# Patient Record
Sex: FEMALE | Race: WHITE | Employment: UNEMPLOYED | URBAN - METROPOLITAN AREA
[De-identification: names, ages, dates, MRNs, and addresses within clinical notes are randomized per-mention and may not be internally consistent; named-entity substitution may affect disease eponyms.]

---

## 2022-07-27 ENCOUNTER — HOSPITAL ENCOUNTER (OUTPATIENT)
Dept: PHYSICAL THERAPY | Age: 37
Discharge: HOME | End: 2022-07-27
Payer: COMMERCIAL

## 2022-07-27 PROCEDURE — 97110 THERAPEUTIC EXERCISES: CPT

## 2022-07-27 PROCEDURE — 97161 PT EVAL LOW COMPLEX 20 MIN: CPT

## 2022-07-27 PROCEDURE — 97140 MANUAL THERAPY 1/> REGIONS: CPT

## 2022-07-27 NOTE — PROGRESS NOTES
[facility]  [address]  [phone] fax  Plan of Care/ Statement of Necessity for Physical Therapy Services    Patient name: Mark Rogel Start of Care: 2022   Referral source: Kris PittsJenniema : 1985    Medical Diagnosis: Radiculopathy, lumbar region [M54.16]  Payor: Maria M Neal / Plan: Michael Mcfarland RPN / Product Type: Commerical /  Onset Date:2022    Treatment Diagnosis: low back pain   Prior Hospitalization: see medical history Provider#: [id number]   Medications: Verified on Patient summary List    Comorbidities: latex allergy, HTN during pregancy    Prior Level of Function: Functionally independent, lives with family, cares for 1year old son      The Plan of Care and following information is based on the information from the initial evaluation. Assessment/ key information: Patient is a pleasant 30year old female who presents with complaints of low back pain that occasionally radiates into the LEs, Right more than Left, since pregnancy with her son three years ago. Patient reports that pain has been present over these three years, but in the past 1-2 months has become more severe and constant without specific aggravating factors, leading her to seek chiropractic and orthopedic care. Extension-based stretches at home have helped as well, leading to current pain being more in her lower back than LEs. X-rays reveal decreased disc height at L4-5, and Patient is scheduled for an MRI on 22.  At evaluation Patient demonstrates good posture and gait, with the following objective measures:   lumbar AROM: flexion WNL, extension to neutral due to pain/fear of pain, rotation 50% of full bilaterally, side bending 75% of full bilaterally  LE strength: hip flexion 5/5, abduction Right 4/5 with pain and Left 4+/5 with pain, extension 5/5 bilaterally with pain Right, quads Right 4/5 with pain and Left 5/5  Full supine bridge but with pain  Impaired hamstring flexibility bilaterally     Negative for red flags, signs and symptoms consistent with mechanical back pain with possible bulging disc. Overall Patient is a good rehab candidate based on premorbid status and will benefit from skilled physical therapy in order to address the above deficits. Evaluation Complexity History MEDIUM  Complexity : 1-2 comorbidities / personal factors will impact the outcome/ POC ; Examination MEDIUM Complexity : 3 Standardized tests and measures addressing body structure, function, activity limitation and / or participation in recreation  ;Presentation LOW Complexity : Stable, uncomplicated  ;Clinical Decision Making MEDIUM Complexity : FOTO score of 26-74  Overall Complexity Rating: LOW   Problem List: pain affecting function, decrease ROM, decrease strength, edema affecting function, impaired gait/ balance, decrease ADL/ functional abilitiies, decrease activity tolerance, decrease flexibility/ joint mobility, and decrease transfer abilities   Treatment Plan may include any combination of the following: Therapeutic exercise, Therapeutic activities, Neuromuscular re-education, Physical agent/modality, Gait/balance training, Manual therapy, Aquatic therapy, Patient education, Self Care training, Functional mobility training, Home safety training, Stair training, and Other: mechanical traction PRN  Patient / Family readiness to learn indicated by: asking questions, trying to perform skills, and interest  Persons(s) to be included in education: patient (P)  Barriers to Learning/Limitations: None  Patient Goal (s): relief, improve daily life  Patient Self Reported Health Status: good  Rehabilitation Potential: good    Short Term Goals: To be accomplished in 1 weeks:  1. Patient will be independent and compliant with HEP in order to progress toward long term goals. Status at last note/certification: issued and reviewed  Long Term Goals:  To be accomplished in 6 weeks:  Patient will improve FOTO assessment score to 72 pts in order to indicate improved functional abilities. Status at last note/certification: 61 pts  2. Patient will improve lumbar AROM to WNL throughout without increased pain in order to improve ease of ADLs. Status at last note/certification: flexion WNL, extension to neutral due to pain/fear of pain, rotation 50% of full bilaterally, side bending 75% of full bilaterally  3. Patient will report no increase in low back pain with supine bridges or with resisted prone hip extension in order to indicate improved gluteal engagement and reduced spinal strain with mobility. Status at last note/certification: pain with bridging, pain with resisted Right hip extension  4. Patient will report worst low back pain as 4/10 or less in order to improve standing and sitting tolerance. Status at last note/certification: 7/23     Frequency / Duration: Patient to be seen 1 to 2 times per week for 6 weeks. Patient/ Caregiver education and instruction: Diagnosis, prognosis, exercises   [x]  Plan of care has been reviewed with PTA    Vianey Uriarte, PT 7/27/2022 1:56 PM  _____________________________________________________________________  I certify that the above Therapy Services are being furnished while the patient is under my care. I agree with the treatment plan and certify that this therapy is necessary.     Physician's Signature:____________Date:_________TIME:________  ADOLFO Young  ** Signature, Date and Time must be completed for valid certification **    Please sign and return to [facility]  [address] [phone] fax

## 2022-07-27 NOTE — PROGRESS NOTES
PT DAILY TREATMENT NOTE     Patient Name: Familia Amanda  HQSW:[id number]  : 1985  [x]  Patient  Verified  Payor: Meghan Schooling / Plan: Khoa Lagunas RPN / Product Type: Commerical /    In time:200  Out time:243  Total Treatment Time (min): 43  Visit #: 1 of     Medicare/BCBS Only   Total Timed Codes (min):   1:1 Treatment Time:         Treatment Area: Radiculopathy, lumbar region [M54.16]    SUBJECTIVE  Pain Level (0-10 scale): 5/10  Any medication changes, allergies to medications, adverse drug reactions, diagnosis change, or new procedure performed?: [x] No    [] Yes (see summary sheet for update)  Subjective functional status/changes:   [] No changes reported  Patient is a pleasant 30year old female who presents with complaints of low back pain that occasionally radiates into the LEs, Right more than Left, since pregnancy with her son three years ago. Patient reports that pain has been present over these three years, but in the past 1-2 months has become more severe and constant, leading her to seek chiropractic and orthopedic care. Extension-based stretches at home have helped as well, leading to current pain being more in her lower back than LEs.      OBJECTIVE    Modality rationale: decrease pain and increase tissue extensibility to improve the patients ability to reduce soreness after exercises    Min Type Additional Details    [] Estim:  []Unatt       []IFC  []Premod                        []Other:  []w/ice   []w/heat  Position:  Location:    [] Estim: []Att    []TENS instruct  []NMES                    []Other:  []w/US   []w/ice   []w/heat  Position:  Location:    []  Traction: [] Cervical       []Lumbar                       [] Prone          []Supine                       []Intermittent   []Continuous Lbs:  [] before manual  [] after manual    []  Ultrasound: []Continuous   [] Pulsed                           []1MHz   []3MHz W/cm2:  Location:    []  Iontophoresis with dexamethasone Location: [] Take home patch   [] In clinic   x []  Ice     []  heat  []  Ice massage  []  Laser   []  Anodyne Position:  Location:    []  Laser with stim  []  Other:  Position:  Location:    []  Vasopneumatic Device    []  Right     []  Left  Pre-treatment girth:  Post-treatment girth:  Measured at (location):  Pressure:       [] lo [] med [] hi   Temperature: [] lo [] med [] hi   [] Skin assessment post-treatment:  []intact []redness- no adverse reaction    []redness - adverse reaction:     Vasopnuematic compression justification:  Per bilateral girth measures taken and listed above the edema is considered significant and having an impact on the patient's strength and gait/posture       18 min [x]Eval                  []Re-Eval       15 min Therapeutic Exercise:  [] See flow sheet :HEP  Patient education on diagnosis and prognosis with spine model    Rationale: increase ROM and increase strength to improve the patients ability to improve sitting, standing tolerance    x min Therapeutic Activity:  []  See flow sheet :   Rationale: increase ROM, increase strength, improve coordination, and increase proprioception  to improve the patients ability to improve spinal stability with functional tasks       10 min Manual Therapy:  prone STM/DTM to lumbar paraspinals and bilateral gluts/piriformis    The manual therapy interventions were performed at a separate and distinct time from the therapeutic activities interventions.   Rationale: decrease pain, increase ROM, increase tissue extensibility, and decrease trigger points to reduce radicular symptoms       With   [] TE   [] TA   [] neuro   [] other: Patient Education: [x] Review HEP    [] Progressed/Changed HEP based on:   [] positioning   [] body mechanics   [] transfers   [] heat/ice application    [] other:      Other Objective/Functional Measures: lumbar AROM: flexion WNL, extension to neutral due to pain/fear of pain, rotation 50% of full bilaterally, side bending 75% of full bilaterally   LE strength: hip flexion 5/5, abduction Right 4/5 with pain and Left 4+/5 with pain, extension 5/5 bilaterally with pain Right, quads Right 4/5 with pain and Left 5/5  Full supine bridge but with pain  Impaired hamstring flexibility bilaterally     Fall Risk Assessment: Does the patient have a fear of falling? No If yes, what fall risk assessment was performed? Pain Level (0-10 scale) post treatment: 5/10    ASSESSMENT/Changes in Function: see POC    Patient will continue to benefit from skilled PT services to modify and progress therapeutic interventions, address functional mobility deficits, address ROM deficits, address strength deficits, analyze and address soft tissue restrictions, analyze and cue movement patterns, analyze and modify body mechanics/ergonomics, assess and modify postural abnormalities, address imbalance/dizziness, and instruct in home and community integration to attain remaining goals.      []  See Plan of Care  []  See progress note/recertification  []  See Discharge Summary         Progress towards goals / Updated goals:  See POC    PLAN  []  Upgrade activities as tolerated     []  Continue plan of care  []  Update interventions per flow sheet       []  Discharge due to:_  []  Other:_      Marcellus Webster, PT 7/27/2022  1:56 PM    Future Appointments   Date Time Provider Mirian Lainez   7/27/2022  2:00 PM Hadyer Giang PT [facility]

## 2022-08-01 ENCOUNTER — APPOINTMENT (OUTPATIENT)
Dept: PHYSICAL THERAPY | Age: 37
End: 2022-08-01
Payer: COMMERCIAL

## 2022-08-05 ENCOUNTER — HOSPITAL ENCOUNTER (OUTPATIENT)
Dept: PHYSICAL THERAPY | Age: 37
Discharge: HOME | End: 2022-08-05
Payer: COMMERCIAL

## 2022-08-05 PROCEDURE — 97110 THERAPEUTIC EXERCISES: CPT

## 2022-08-05 PROCEDURE — 97014 ELECTRIC STIMULATION THERAPY: CPT

## 2022-08-05 PROCEDURE — 97140 MANUAL THERAPY 1/> REGIONS: CPT

## 2022-08-05 PROCEDURE — 97530 THERAPEUTIC ACTIVITIES: CPT

## 2022-08-05 NOTE — PROGRESS NOTES
PT DAILY TREATMENT NOTE     Patient Name: Neva Choudhary  ZNEV:  : 1985  [x]  Patient  Verified  Payor: Galen Girt / Plan: Andre Potter RPN / Product Type: Commerical /    In time:833  Out time:938  Total Treatment Time (min): 65  Visit #: 2 of     Medicare/BCBS Only   Total Timed Codes (min):   1:1 Treatment Time:         Treatment Area: Radiculopathy, lumbar region [M54.16]    SUBJECTIVE  Pain Level (0-10 scale): 7/10  Any medication changes, allergies to medications, adverse drug reactions, diagnosis change, or new procedure performed?: [x] No    [] Yes (see summary sheet for update)  Subjective functional status/changes:   [] No changes reported  Monday I got up wrong and I was in bed all day.      OBJECTIVE    Modality rationale: decrease pain and increase tissue extensibility to improve the patients ability to reduce soreness after exercises    Min Type Additional Details   15 + 5 min set up [x] Estim:  [x]Unatt       [x]IFC  []Premod                        []Other:  []w/ice   [x]w/heat  Position:reclined with wedge  Location:Right low back/buttocks     [] Estim: []Att    []TENS instruct  []NMES                    []Other:  []w/US   []w/ice   []w/heat  Position:  Location:    []  Traction: [] Cervical       []Lumbar                       [] Prone          []Supine                       []Intermittent   []Continuous Lbs:  [] before manual  [] after manual    []  Ultrasound: []Continuous   [] Pulsed                           []1MHz   []3MHz W/cm2:  Location:    []  Iontophoresis with dexamethasone         Location: [] Take home patch   [] In clinic    []  Ice     []  heat  []  Ice massage  []  Laser   []  Anodyne Position:  Location:    []  Laser with stim  []  Other:  Position:  Location:    []  Vasopneumatic Device    []  Right     []  Left  Pre-treatment girth:  Post-treatment girth:  Measured at (location):  Pressure:       [] lo [] med [] hi   Temperature: [] lo [] med [] hi   [x] Skin assessment post-treatment:  [x]intact []redness- no adverse reaction    []redness - adverse reaction:     Vasopnuematic compression justification:  Per bilateral girth measures taken and listed above the edema is considered significant and having an impact on the patient's strength and gait/posture       15 min Therapeutic Exercise:  [x] See flow sheet :  DONNIE  Prone press ups  Prone hip IR/ER  Band/ball   Rationale: increase ROM and increase strength to improve the patients ability to improve sitting, standing tolerance     20 min Therapeutic Activity:  [x]  See flow sheet :  Prone hip extension   Bridges  Clams x 2  S/l leg circles - hold  Dead bugs (alternate foot down)  Standing marches with shoulder extension    Rationale: increase ROM, increase strength, improve coordination, and increase proprioception  to improve the patients ability to improve spinal stability with functional tasks     10 min Manual Therapy:  prone STM/DTM to Right lumbar paraspinals and Right gluts/piriformis    The manual therapy interventions were performed at a separate and distinct time from the therapeutic activities interventions. Rationale: decrease pain, increase ROM, increase tissue extensibility, and decrease trigger points to reduce radicular symptoms      With   [] TE   [] TA   [] neuro   [] other: Patient Education: [x] Review HEP    [] Progressed/Changed HEP based on:   [] positioning   [] body mechanics   [] transfers   [] heat/ice application    [] other:      Other Objective/Functional Measures: initiated treatment per flow sheet  Increased lumbar pinching during prone Right hip external rotation    Pinching in the lower back during supine bridges     Pain Level (0-10 scale) post treatment: 6/10    ASSESSMENT/Changes in Function: Patient reports reduction in pain following manual interventions at evaluation.  She was scheduled for an MRI tomorrow but insurance denied imaging so she is now in the process of getting an MRI approved for later this month. Earlier this week Patient \"moved wrong\" when getting out of bed and had to rest most of the day due to pain. Pain levels vary based on activity. Increased pinching in the Right lower back today with bridging and bed mobility. Good tolerance to modalities. Progress as able. Patient will continue to benefit from skilled PT services to modify and progress therapeutic interventions, address functional mobility deficits, address ROM deficits, address strength deficits, analyze and address soft tissue restrictions, analyze and cue movement patterns, analyze and modify body mechanics/ergonomics, assess and modify postural abnormalities, address imbalance/dizziness, and instruct in home and community integration to attain remaining goals. []  See Plan of Care  []  See progress note/recertification  []  See Discharge Summary         Progress towards goals / Updated goals:  Short Term Goals: To be accomplished in 1 weeks:  1. Patient will be independent and compliant with HEP in order to progress toward long term goals. Status at last note/certification: issued and reviewed  Current status: progressing, has initiated 8/5/22  Long Term Goals: To be accomplished in 6 weeks:  Patient will improve FOTO assessment score to 72 pts in order to indicate improved functional abilities. Status at last note/certification: 61 pts  Current status:   2. Patient will improve lumbar AROM to WNL throughout without increased pain in order to improve ease of ADLs. Status at last note/certification: flexion WNL, extension to neutral due to pain/fear of pain, rotation 50% of full bilaterally, side bending 75% of full bilaterally  Current status:   3. Patient will report no increase in low back pain with supine bridges or with resisted prone hip extension in order to indicate improved gluteal engagement and reduced spinal strain with mobility.   Status at last note/certification: pain with bridging, pain with resisted Right hip extension  Current status: not met, low back pain/pinching with bridging 8/5/22  4. Patient will report worst low back pain as 4/10 or less in order to improve standing and sitting tolerance.   Status at last note/certification: 1/88  Current status:     PLAN  []  Upgrade activities as tolerated     [x]  Continue plan of care  []  Update interventions per flow sheet       []  Discharge due to:_  []  Other:_      Paresh Li, PT 8/5/2022  7:53 AM    Future Appointments   Date Time Provider Mirian Lainez   8/5/2022  8:30 AM Sherie Amos, PT [facility]   8/8/2022  2:00 PM Shin Swenson, PT [facility]   8/15/2022  2:00 PM Sherie Amos, PT [facility]   8/22/2022  2:00 PM Sherie Amos, PT [facility]

## 2022-08-08 ENCOUNTER — HOSPITAL ENCOUNTER (OUTPATIENT)
Dept: PHYSICAL THERAPY | Age: 37
Discharge: HOME | End: 2022-08-08
Payer: COMMERCIAL

## 2022-08-08 PROCEDURE — 97530 THERAPEUTIC ACTIVITIES: CPT | Performed by: PHYSICAL THERAPIST

## 2022-08-08 PROCEDURE — 97014 ELECTRIC STIMULATION THERAPY: CPT | Performed by: PHYSICAL THERAPIST

## 2022-08-08 PROCEDURE — 97110 THERAPEUTIC EXERCISES: CPT | Performed by: PHYSICAL THERAPIST

## 2022-08-08 PROCEDURE — 97140 MANUAL THERAPY 1/> REGIONS: CPT | Performed by: PHYSICAL THERAPIST

## 2022-08-08 NOTE — PROGRESS NOTES
PT DAILY TREATMENT NOTE     Patient Name: Bianka Park  XMHZ:[id number]  : 1985  [x]  Patient  Verified  Payor: [employer] / Plan: Thee Nurse RPN / Product Type: Commerical /    In time: 200 pm   Out time: 312pm  Total Treatment Time (min): 72min  Visit #: 3 of     Medicare/BCBS Only   Total Timed Codes (min):   1:1 Treatment Time:         Treatment Area: Radiculopathy, lumbar region [M54.16]    SUBJECTIVE  Pain Level (0-10 scale): 6/10  Any medication changes, allergies to medications, adverse drug reactions, diagnosis change, or new procedure performed?: [x] No    [] Yes (see summary sheet for update)  Subjective functional status/changes:   [] No changes reported  I am able to walk for a consistent 30 min and it makes it feel better.      OBJECTIVE    Modality rationale: decrease pain and increase tissue extensibility to improve the patients ability to reduce soreness after exercises    Min Type Additional Details   15 + 5 min set up [x] Estim:  [x]Unatt       [x]IFC  []Premod                        []Other:  []w/ice   [x]w/ heat  Position:reclined with wedge  Location:Right low back/buttocks     [] Estim: []Att    []TENS instruct  []NMES                    []Other:  []w/US   []w/ice   []w/heat  Position:  Location:    []  Traction: [] Cervical       []Lumbar                       [] Prone          []Supine                       []Intermittent   []Continuous Lbs:  [] before manual  [] after manual    []  Ultrasound: []Continuous   [] Pulsed                           []1MHz   []3MHz W/cm2:  Location:    []  Iontophoresis with dexamethasone         Location: [] Take home patch   [] In clinic    []  Ice     []  heat  []  Ice massage  []  Laser   []  Anodyne Position:  Location:    []  Laser with stim  []  Other:  Position:  Location:    []  Vasopneumatic Device    []  Right     []  Left  Pre-treatment girth:  Post-treatment girth:  Measured at (location):  Pressure:       [] lo [] med [] hi Temperature: [] lo [] med [] hi   [x] Skin assessment post-treatment:  [x]intact []redness- no adverse reaction    []redness - adverse reaction:     Vasopnuematic compression justification:  Per bilateral girth measures taken and listed above the edema is considered significant and having an impact on the patient's strength and gait/posture       22 min Therapeutic Exercise:  [x] See flow sheet :  DONNIE  Prone press ups  Prone hip IR/ER  Band/ball   Rationale: increase ROM and increase strength to improve the patients ability to improve sitting, standing tolerance     20 min Therapeutic Activity:  [x]  See flow sheet :  Prone hip extension   Bridges  Clams x 2  S/l leg circles - hold  Dead bugs (alternate foot down)  Standing marches with shoulder extension    Rationale: increase ROM, increase strength, improve coordination, and increase proprioception  to improve the patients ability to improve spinal stability with functional tasks     10 min Manual Therapy:  prone STM/DTM to Right lumbar paraspinals and Right/Left gluts/piriformis , DTM to QL mm, X hand manual traction to l/s   The manual therapy interventions were performed at a separate and distinct time from the therapeutic activities interventions.   Rationale: decrease pain, increase ROM, increase tissue extensibility, and decrease trigger points to reduce radicular symptoms      With   [] TE   [] TA   [] neuro   [] other: Patient Education: [x] Review HEP    [] Progressed/Changed HEP based on:   [] positioning   [] body mechanics   [] transfers   [] heat/ice application    [] other:      Other Objective/Functional Measures:   Pt educated in hip hinge for lifting son  Added glut isometrics due to pain with bridging last session  Added standing hip extensions in half prone   Possibly add l/s traction in next few sessions  TTP in Marcial QL R>L and with central PA over L4/L5     Pain Level (0-10 scale) post treatment: 5/10    ASSESSMENT/Changes in Function: Patient reports increased pinching feeling with prone heel press addition but decreased with 50% contraction. Pt presents with increased tension in Marcial Piriformis R>L, mod TTp with PA glides at L4/L5. Pt reports mod pinching with TA draw, but was able to perform PPT with decreased pinching reported. Increased mm tension noted in QL marcial as well. Progress with core activation/strengthening as able. Held dead bugs as patient reports increased pinching with TA today. Patient will continue to benefit from skilled PT services to modify and progress therapeutic interventions, address functional mobility deficits, address ROM deficits, address strength deficits, analyze and address soft tissue restrictions, analyze and cue movement patterns, analyze and modify body mechanics/ergonomics, assess and modify postural abnormalities, address imbalance/dizziness, and instruct in home and community integration to attain remaining goals. []  See Plan of Care  []  See progress note/recertification  []  See Discharge Summary         Progress towards goals / Updated goals:  Short Term Goals: To be accomplished in 1 weeks:  1. Patient will be independent and compliant with HEP in order to progress toward long term goals. Status at last note/certification: issued and reviewed  Current status: progressing, has initiated 8/5/22  Long Term Goals: To be accomplished in 6 weeks:  Patient will improve FOTO assessment score to 72 pts in order to indicate improved functional abilities. Status at last note/certification: 61 pts  Current status:   2. Patient will improve lumbar AROM to WNL throughout without increased pain in order to improve ease of ADLs. Status at last note/certification: flexion WNL, extension to neutral due to pain/fear of pain, rotation 50% of full bilaterally, side bending 75% of full bilaterally  Current status:   3.  Patient will report no increase in low back pain with supine bridges or with resisted prone hip extension in order to indicate improved gluteal engagement and reduced spinal strain with mobility. Status at last note/certification: pain with bridging, pain with resisted Right hip extension  Current status: not met, low back pain/pinching with bridging 8/5/22  4. Patient will report worst low back pain as 4/10 or less in order to improve standing and sitting tolerance.   Status at last note/certification: 1/46  Current status:     PLAN  []  Upgrade activities as tolerated     [x]  Continue plan of care  []  Update interventions per flow sheet       []  Discharge due to:_  []  Other:_      Mateo Velásquez, PT 8/8/2022  7:53 AM    Future Appointments   Date Time Provider Mirian Lainez   8/8/2022  2:00 PM Lynnette August, PT [facility]   8/15/2022  2:00 PM Christina Garcia [facility]   8/22/2022  2:00 PM Yaya Tian, PT [facility]

## 2022-08-15 ENCOUNTER — HOSPITAL ENCOUNTER (OUTPATIENT)
Dept: PHYSICAL THERAPY | Age: 37
Discharge: HOME | End: 2022-08-15
Payer: COMMERCIAL

## 2022-08-15 PROCEDURE — 97140 MANUAL THERAPY 1/> REGIONS: CPT

## 2022-08-15 PROCEDURE — 97110 THERAPEUTIC EXERCISES: CPT

## 2022-08-15 PROCEDURE — 97014 ELECTRIC STIMULATION THERAPY: CPT

## 2022-08-15 PROCEDURE — 97530 THERAPEUTIC ACTIVITIES: CPT

## 2022-08-15 NOTE — PROGRESS NOTES
PT DAILY TREATMENT NOTE     Patient Name: Mildred Vargas  YHXV:[id number]  : 1985  [x]  Patient  Verified  Payor: Alberto [employer] / Plan: Ublado Good RPN / Product Type: Commerical /    In time:201  Out time:303  Total Treatment Time (min): 62  Visit #: 4 of     Medicare/BCBS Only   Total Timed Codes (min):   1:1 Treatment Time:         Treatment Area: Radiculopathy, lumbar region [M54.16]    SUBJECTIVE  Pain Level (0-10 scale): 7/10  Any medication changes, allergies to medications, adverse drug reactions, diagnosis change, or new procedure performed?: [x] No    [] Yes (see summary sheet for update)  Subjective functional status/changes:   [] No changes reported  We were out of town this weekend and the hotel bed messed up my back.      OBJECTIVE    Modality rationale: decrease pain and increase tissue extensibility to improve the patients ability to reduce soreness after exercises    Min Type Additional Details   15 [x] Estim:  [x]Unatt       [x]IFC  []Premod                        []Other:  []w/ice   [x]w/heat  Position:reclined with wedge  Location:Right low back, buttocks     [] Estim: []Att    []TENS instruct  []NMES                    []Other:  []w/US   []w/ice   []w/heat  Position:  Location:    []  Traction: [] Cervical       []Lumbar                       [] Prone          []Supine                       []Intermittent   []Continuous Lbs:  [] before manual  [] after manual    []  Ultrasound: []Continuous   [] Pulsed                           []1MHz   []3MHz W/cm2:  Location:    []  Iontophoresis with dexamethasone         Location: [] Take home patch   [] In clinic    []  Ice     []  heat  []  Ice massage  []  Laser   []  Anodyne Position:  Location:    []  Laser with stim  []  Other:  Position:  Location:    []  Vasopneumatic Device    []  Right     []  Left  Pre-treatment girth:  Post-treatment girth:  Measured at (location):  Pressure:       [] lo [] med [] hi   Temperature: [] lo [] med [] hi [x] Skin assessment post-treatment:  [x]intact []redness- no adverse reaction    []redness - adverse reaction:     Vasopnuematic compression justification:  Per bilateral girth measures taken and listed above the edema is considered significant and having an impact on the patient's strength and gait/posture     15 min Therapeutic Exercise:  [x] See flow sheet :  DONNIE  Prone press ups  Prone hip IR/ER- hold  Band/ball   Rationale: increase ROM and increase strength to improve the patients ability to improve sitting, standing tolerance     17 min Therapeutic Activity:  [x]  See flow sheet :  Prone hip extension  Bridges  Clams x 2  S/l leg circles - hold  Dead bugs (alternate foot down)  Standing marches with shoulder extension  Quadruped fire hydrants    Rationale: increase ROM, increase strength, improve coordination, and increase proprioception  to improve the patients ability to improve spinal stability with functional tasks     15 min Manual Therapy:  prone STM/DTM to Right lumbar paraspinals and Right gluts/piriformis , DTM to Right QL mm, manual Right QL release   The manual therapy interventions were performed at a separate and distinct time from the therapeutic activities interventions.   Rationale: decrease pain, increase ROM, increase tissue extensibility, and decrease trigger points to reduce radicular symptoms       With   [] TE   [] TA   [] neuro   [] other: Patient Education: [x] Review HEP    [] Progressed/Changed HEP based on:   [] positioning   [] body mechanics   [] transfers   [] heat/ice application    [] other:      Other Objective/Functional Measures: added quadruped dead bugs- increased pinching in the Right buttocks with performing on Right side (minimal pinching when performing on Left)  Right 3rd and 4th toes going numb when in quadruped performing Left fire hydrants    Lumbar flexion AROM grossly WNL without increased pain, extension 25% of full with increased back pain    Pain Level (0-10 scale) post treatment: 6/10    ASSESSMENT/Changes in Function: Patient reports that sleeping in a hotel bed for one night over the weekend increased her lower back pain. She did get some relief when she returned home with heating pad, ice pack, and Motrin. She also reports that sitting in her neighbor's pool helped lessen pain. This coming weekend she will be out of town for three nights, and plans to bring heating pad and ice with her. After most recent therapy session Patient felt increased lower back soreness, but reports no increase in radicular symptoms. EMR next visit for authorization. Patient will continue to benefit from skilled PT services to modify and progress therapeutic interventions, address functional mobility deficits, address ROM deficits, address strength deficits, analyze and address soft tissue restrictions, analyze and cue movement patterns, analyze and modify body mechanics/ergonomics, assess and modify postural abnormalities, address imbalance/dizziness, and instruct in home and community integration to attain remaining goals. []  See Plan of Care  []  See progress note/recertification  []  See Discharge Summary         Progress towards goals / Updated goals:  Short Term Goals: To be accomplished in 1 weeks:  1. Patient will be independent and compliant with HEP in order to progress toward long term goals. Status at last note/certification: issued and reviewed  Current status: progressing, has initiated 8/5/22  Long Term Goals: To be accomplished in 6 weeks:  Patient will improve FOTO assessment score to 72 pts in order to indicate improved functional abilities. Status at last note/certification: 61 pts  Current status: reassess at MD note  2. Patient will improve lumbar AROM to WNL throughout without increased pain in order to improve ease of ADLs.   Status at last note/certification: flexion WNL, extension to neutral due to pain/fear of pain, rotation 50% of full bilaterally, side bending 75% of full bilaterally  Current status: progressing, flexion WNL, extension 25% of full with pain 8/15/22  3. Patient will report no increase in low back pain with supine bridges or with resisted prone hip extension in order to indicate improved gluteal engagement and reduced spinal strain with mobility. Status at last note/certification: pain with bridging, pain with resisted Right hip extension  Current status: not met, painful 8/15/22  4. Patient will report worst low back pain as 4/10 or less in order to improve standing and sitting tolerance.   Status at last note/certification: 8/45  Current status: progressing, 8/10 recently 8/15/22    PLAN  [x]  Upgrade activities as tolerated     [x]  Continue plan of care  [x]  Update interventions per flow sheet       []  Discharge due to:_  []  Other:_      Paresh Li, PT 8/15/2022  2:02 PM    Future Appointments   Date Time Provider Mirian Lainez   8/22/2022  2:00 PM Sherie Amos, PT [facility]

## 2022-08-22 ENCOUNTER — HOSPITAL ENCOUNTER (OUTPATIENT)
Dept: PHYSICAL THERAPY | Age: 37
Discharge: HOME | End: 2022-08-22
Payer: COMMERCIAL

## 2022-08-22 PROCEDURE — 97530 THERAPEUTIC ACTIVITIES: CPT

## 2022-08-22 PROCEDURE — 97110 THERAPEUTIC EXERCISES: CPT

## 2022-08-22 PROCEDURE — 97014 ELECTRIC STIMULATION THERAPY: CPT

## 2022-08-22 PROCEDURE — 97140 MANUAL THERAPY 1/> REGIONS: CPT

## 2022-08-22 NOTE — PROGRESS NOTES
[facility]  [address]  [phone] fax    Progress Note  Patient name: Colonel Boudreaux Start of Care: 2022   Referral source: Dulce John, [address] : 1985   Medical/Treatment Diagnosis: Radiculopathy, lumbar region [M54.16]  Payor: Malick [employer] / Plan: Kimmie GIL / Product Type: Commerical /  Onset Date:2022     Prior Hospitalization: see medical history Provider#: [id number]   Medications: Verified on Patient Summary List     Comorbidities: latex allergy, HTN during pregancy    Prior Level of Function: Functionally independent, lives with family, cares for 1year old son    Visits from Start of Care: 5    Missed Visits: 0    Established Goals:          Excellent Good         Limited           None  [] Increased ROM   []  []  []  []  [x] Increased Strength  []  []  [x]  []  [x] Increased Mobility  []  []  [x]  []   [x] Decreased Pain   []  []  [x]  []  [] Decreased Swelling  []  []  []  []    Short Term Goals: To be accomplished in 1 weeks:  1. Patient will be independent and compliant with HEP in order to progress toward long term goals. Status at last note/certification: issued and reviewed  Current status: progressing, some compliance 22  Long Term Goals: To be accomplished in 6 weeks:  Patient will improve FOTO assessment score to 72 pts in order to indicate improved functional abilities. Status at last note/certification: 61 pts  Current status: not met, remains 61 pts 22  2. Patient will improve lumbar AROM to WNL throughout without increased pain in order to improve ease of ADLs. Status at last note/certification: flexion WNL, extension to neutral due to pain/fear of pain, rotation 50% of full bilaterally, side bending 75% of full bilaterally  Current status: not met, flexion 75% of full with pain returning to neutral, extension less than 25% with pain, rotation 50% bilaterally with pain 22  3. Patient will report no increase in low back pain with supine bridges or with resisted prone hip extension in order to indicate improved gluteal engagement and reduced spinal strain with mobility. Status at last note/certification: pain with bridging, pain with resisted Right hip extension  Current status: not met, painful with limited mobility 8/22/22  4. Patient will report worst low back pain as 4/10 or less in order to improve standing and sitting tolerance. Status at last note/certification: 5/13  Current status: not met, 10/10 8/22/22    Key Functional Changes:  lumbar AROM: flexion 75% of full with pain returning to neutral, extension less than 25% with pain, rotation 50% bilaterally with pain   Pain with supine bridge  Functional Gains: some improvement in mobility after walking but overall limited improvement thus far  Functional Deficits: increased back pain with sitting in car for long periods/sitting in general (more than 30 minutes), sleeping on new bed (has had increased pain being out of town on two occasions), difficulty standing up straight after sitting, intermittent sharp pain into Right buttocks/thigh   % improvement: limited  Pain   Average: 6-7/10       Best: 5/10     Worst: 10/10  Patient Goal: \"to not have any pain\"     Updated Goals: to be achieved in 6 weeks:  Patient will improve FOTO assessment score to 72 pts in order to indicate improved functional abilities. Status at last note/certification: 61 pts  2. Patient will improve lumbar AROM to WNL throughout without increased pain in order to improve ease of ADLs. Status at last note/certification: flexion 75% of full with pain returning to neutral, extension less than 25% with pain, rotation 50% bilaterally with pain   3. Patient will report no increase in low back pain with supine bridges or with resisted prone hip extension in order to indicate improved gluteal engagement and reduced spinal strain with mobility.   Status at last note/certification: painful with limited mobility   4. Patient will report worst low back pain as 4/10 or less in order to improve standing and sitting tolerance. Status at last note/certification: 01/65    ASSESSMENT/RECOMMENDATIONS: Patient has attended therapy for 5 sessions with focus on spinal mobility and core and hip stability, in order to manage pain levels. We have been using gentle extension-based activities to reduce disc compression, and Patient reports decreased pain following manual interventions and modalities. Currently she reports overall no real improvement in pain levels and activity tolerance (due to needing to reassess after just 4 follow ups), and reports worst pain as 10/10. Patient is challenged with transfers and bed mobility due to pain, and reports increased pain after sitting or standing for 30 minutes, drastically affecting her ability to play with and care for her young son. Recent out of town over-night trips have increased her pain due to long car rides and sleeping in a hotel bed. Patient had an MRI this morning and follows up with MD in two days. She would benefit from continued skilled physical therapy in order to progress overall strength and mobility to reduce back pain.      [x]Continue therapy per initial plan/protocol at a frequency of  1 to 2 x per week for 6 weeks  []Continue therapy with the following recommended changes:_____________________      _____________________________________________________________________  []Discontinue therapy progressing towards or have reached established goals  []Discontinue therapy due to lack of appreciable progress towards goals  []Discontinue therapy due to lack of attendance or compliance  []Await Physician's recommendations/decisions regarding therapy  []Other:________________________________________________________________    Thank you for this referral.   José Miguel Varma, PT 8/22/2022 1:50 PM  NOTE TO PHYSICIAN:  PLEASE COMPLETE THE ORDERS BELOW AND   FAX TO [facility]: [phone]  If you are unable to process this request in 24 hours please contact our office: [phone]  []  I have read the above report and request that my patient continue as recommended. []  I have read the above report and request that my patient continue therapy with the following changes/special instructions:________________________________________  []I have read the above report and request that my patient be discharged from therapy.     Physician's Signature:____________Date:_________TIME:________  ADOLFO Meneses    ** Signature, Date and Time must be completed for valid certification **

## 2022-08-22 NOTE — PROGRESS NOTES
PT DAILY TREATMENT NOTE     Patient Name: Reema Wheeler  TLNP:  : 1985  [x]  Patient  Verified  Payor: Eliot Pierce / Plan: Andrei Rogers RPN / Product Type: Commerical /    In time:203  Out time:308  Total Treatment Time (min): 65  Visit #: 5 of     Medicare/BCBS Only   Total Timed Codes (min):   1:1 Treatment Time:         Treatment Area: Radiculopathy, lumbar region [M54.16]    SUBJECTIVE  Pain Level (0-10 scale): 9/10  Any medication changes, allergies to medications, adverse drug reactions, diagnosis change, or new procedure performed?: [x] No    [] Yes (see summary sheet for update)  Subjective functional status/changes:   [] No changes reported  I had my MRI this morning, I follow up on Wednesday.      OBJECTIVE    Modality rationale: decrease pain and increase tissue extensibility to improve the patients ability to reduce soreness after exercises    Min Type Additional Details   15 + 5 set up [x] Estim:  [x]Unatt       [x]IFC  []Premod                        []Other:  []w/ice   [x]w/heat  Position:reclined  Location:Right back/buttocks     [] Estim: []Att    []TENS instruct  []NMES                    []Other:  []w/US   []w/ice   []w/heat  Position:  Location:    []  Traction: [] Cervical       []Lumbar                       [] Prone          []Supine                       []Intermittent   []Continuous Lbs:  [] before manual  [] after manual    []  Ultrasound: []Continuous   [] Pulsed                           []1MHz   []3MHz W/cm2:  Location:    []  Iontophoresis with dexamethasone         Location: [] Take home patch   [] In clinic    []  Ice     []  heat  []  Ice massage  []  Laser   []  Anodyne Position:  Location:    []  Laser with stim  []  Other:  Position:  Location:    []  Vasopneumatic Device    []  Right     []  Left  Pre-treatment girth:  Post-treatment girth:  Measured at (location):  Pressure:       [] lo [] med [] hi   Temperature: [] lo [] med [] hi   [x] Skin assessment post-treatment:  [x]intact []redness- no adverse reaction    []redness - adverse reaction:     Vasopnuematic compression justification:  Per bilateral girth measures taken and listed above the edema is considered significant and having an impact on the patient's strength and gait/posture     17 min Therapeutic Exercise:  [x] See flow sheet :  DONNIE  Prone press ups  Prone hip IR/ER- hold  Band/ball  FOTO/reassessment    Rationale: increase ROM and increase strength to improve the patients ability to improve sitting, standing tolerance     15 min Therapeutic Activity:  [x]  See flow sheet :  Prone hip extension  Bridges  Clams x 2  S/l leg circles - hold  Dead bugs (alternate foot down)  Standing marches with shoulder extension- hold  Quadruped fire hydrants -hold    Rationale: increase ROM, increase strength, improve coordination, and increase proprioception  to improve the patients ability to improve spinal stability with functional tasks     13 min Manual Therapy:  prone STM/DTM to Right lumbar paraspinals and Right gluts/piriformis , DTM to Right QL mm, manual Right QL release   The manual therapy interventions were performed at a separate and distinct time from the therapeutic activities interventions.   Rationale: decrease pain, increase ROM, increase tissue extensibility, and decrease trigger points to reduce radicular symptoms       With   [] TE   [] TA   [] neuro   [] other: Patient Education: [x] Review HEP    [] Progressed/Changed HEP based on:   [] positioning   [] body mechanics   [] transfers   [] heat/ice application    [] other:      Other Objective/Functional Measures: lumbar AROM: flexion 75% of full with pain returning to neutral, extension less than 25% with pain, rotation 50% bilaterally with pain   Pain with supine bridge  Functional Gains: some improvement in mobility after walking but overall limited improvement thus far  Functional Deficits: increased back pain with sitting in car for long periods/sitting in general (more than 30 minutes), sleeping on new bed (has had increased pain being out of town on two occasions), difficulty standing up straight after sitting, intermittent sharp pain into Right buttocks/thigh   % improvement: limited  Pain   Average: 6-7/10       Best: 5/10     Worst: 10/10  Patient Goal: \"to not have any pain\"     Pain Level (0-10 scale) post treatment: 8/10    ASSESSMENT/Changes in Function: Patient has attended therapy for 5 sessions with focus on spinal mobility and core and hip stability, in order to manage pain levels. We have been using gentle extension-based activities to reduce disc compression, and Patient reports decreased pain following manual interventions and modalities. Currently she reports overall no real improvement in pain levels and activity tolerance (due to needing to reassess after just 4 follow ups), and reports worst pain as 10/10. Patient is challenged with transfers and bed mobility due to pain, and reports increased pain after sitting or standing for 30 minutes, drastically affecting her ability to play with and care for her young son. Recent out of town over-night trips have increased her pain due to long car rides and sleeping in a hotel bed. Patient had an MRI this morning and follows up with MD in two days. She would benefit from continued skilled physical therapy in order to progress overall strength and mobility to reduce back pain. Patient will continue to benefit from skilled PT services to modify and progress therapeutic interventions, address functional mobility deficits, address ROM deficits, address strength deficits, analyze and address soft tissue restrictions, analyze and cue movement patterns, analyze and modify body mechanics/ergonomics, assess and modify postural abnormalities, address imbalance/dizziness, and instruct in home and community integration to attain remaining goals.      []  See Plan of Care  [x]  See progress note/recertification  []  See Discharge Summary         Progress towards goals / Updated goals:  Short Term Goals: To be accomplished in 1 weeks:  1. Patient will be independent and compliant with HEP in order to progress toward long term goals. Status at last note/certification: issued and reviewed  Current status: progressing, some compliance 8/22/22  Long Term Goals: To be accomplished in 6 weeks:  Patient will improve FOTO assessment score to 72 pts in order to indicate improved functional abilities. Status at last note/certification: 61 pts  Current status: not met, remains 61 pts 8/22/22  2. Patient will improve lumbar AROM to WNL throughout without increased pain in order to improve ease of ADLs. Status at last note/certification: flexion WNL, extension to neutral due to pain/fear of pain, rotation 50% of full bilaterally, side bending 75% of full bilaterally  Current status: not met, flexion 75% of full with pain returning to neutral, extension less than 25% with pain, rotation 50% bilaterally with pain 8/22/22  3. Patient will report no increase in low back pain with supine bridges or with resisted prone hip extension in order to indicate improved gluteal engagement and reduced spinal strain with mobility. Status at last note/certification: pain with bridging, pain with resisted Right hip extension  Current status: not met, painful with limited mobility 8/22/22  4. Patient will report worst low back pain as 4/10 or less in order to improve standing and sitting tolerance.   Status at last note/certification: 6/78  Current status: not met, 10/10 8/22/22    PLAN  [x]  Upgrade activities as tolerated     [x]  Continue plan of care  []  Update interventions per flow sheet       []  Discharge due to _  []  Other:_      Yumiko Duncan PT 8/22/2022  1:49 PM    Future Appointments   Date Time Provider Mirian Lainez   8/22/2022  2:00 PM Jose Maria Garner, PT [facility]

## 2022-10-26 NOTE — PROGRESS NOTES
[facility]  [address]  [phone] fax    Discharge Summary    Patient name: Garrett Curry Start of Care: 2022   Referral source: Jennie Blockma : 1985   Medical/Treatment Diagnosis: Radiculopathy, lumbar region [M54.16]  Payor: Lamar Welch / Plan: Nika GIL / Product Type: Commerical /  Onset Date:2022      Prior Hospitalization: see medical history Provider#: [id number]   Medications: Verified on Patient Summary List      Comorbidities: latex allergy, HTN during pregancy    Prior Level of Function: Functionally independent, lives with family, cares for 1year old son     Visits from Start of Care: 5                                      Missed Visits: 0    Reporting Period : 22 to 22    Progress Toward Goals: Unable to reassess due to Patient did not return to therapy  Patient will improve FOTO assessment score to 72 pts in order to indicate improved functional abilities. Status at last note/certification: 61 pts  2. Patient will improve lumbar AROM to WNL throughout without increased pain in order to improve ease of ADLs. Status at last note/certification: flexion 75% of full with pain returning to neutral, extension less than 25% with pain, rotation 50% bilaterally with pain   3. Patient will report no increase in low back pain with supine bridges or with resisted prone hip extension in order to indicate improved gluteal engagement and reduced spinal strain with mobility. Status at last note/certification: painful with limited mobility   4. Patient will report worst low back pain as 4/10 or less in order to improve standing and sitting tolerance. Status at last note/certification: 53/60      Assessment/ Summary of Care: Patient attended therapy for 5 treatment sessions for low back pain, and after 5th session a request was made to her insurance to approve more visits.  Once visits were approved Patient asked to be placed on hold due to upcoming MRI and follow up with MD. At this time we have not heard back from Patient, and she will be discharged. Thank you for the referral of this Patient.      RECOMMENDATIONS:  [x]Discontinue therapy: []Patient has reached or is progressing toward set goals      [x]Patient is non-compliant or has abdicated      []Due to lack of appreciable progress towards set [address] Han, PT 10/26/2022 7:05 PM